# Patient Record
Sex: MALE | URBAN - METROPOLITAN AREA
[De-identification: names, ages, dates, MRNs, and addresses within clinical notes are randomized per-mention and may not be internally consistent; named-entity substitution may affect disease eponyms.]

---

## 2019-11-20 ENCOUNTER — NEW PATIENT (OUTPATIENT)
Dept: URBAN - METROPOLITAN AREA CLINIC 10 | Facility: CLINIC | Age: 44
End: 2019-11-20
Payer: MEDICARE

## 2019-11-20 DIAGNOSIS — H02.422 MYOGENIC PTOSIS OF LEFT EYELID: Primary | ICD-10-CM

## 2019-11-20 DIAGNOSIS — H49.21 SIXTH [ABDUCENT] NERVE PALSY, RIGHT EYE: ICD-10-CM

## 2019-11-20 PROCEDURE — 92004 COMPRE OPH EXAM NEW PT 1/>: CPT | Performed by: OPTOMETRIST

## 2019-11-20 PROCEDURE — 92134 CPTRZ OPH DX IMG PST SGM RTA: CPT | Performed by: OPTOMETRIST

## 2019-11-20 ASSESSMENT — VISUAL ACUITY
OS: 20/30
OD: 20/25

## 2019-11-20 ASSESSMENT — INTRAOCULAR PRESSURE
OS: 12
OD: 11

## 2019-11-20 ASSESSMENT — KERATOMETRY
OS: 40.13
OD: 40.25

## 2019-12-16 ENCOUNTER — OFFICE VISIT (OUTPATIENT)
Dept: URBAN - METROPOLITAN AREA CLINIC 40 | Facility: CLINIC | Age: 44
End: 2019-12-16
Payer: MEDICARE

## 2019-12-16 DIAGNOSIS — G70.00 MYASTHENIA GRAVIS: Primary | ICD-10-CM

## 2019-12-16 PROCEDURE — 92004 COMPRE OPH EXAM NEW PT 1/>: CPT | Performed by: OPTOMETRIST

## 2019-12-16 ASSESSMENT — KERATOMETRY
OD: 38.75
OS: 40.25

## 2019-12-16 NOTE — IMPRESSION/PLAN
Impression: Myasthenia gravis: G70.00. Plan: Discussed diagnosis in detail with patient. Will continue to observe condition and or symptoms. Refer to oculoplastics for evaluation and treatment.